# Patient Record
Sex: FEMALE | Race: WHITE | HISPANIC OR LATINO
[De-identification: names, ages, dates, MRNs, and addresses within clinical notes are randomized per-mention and may not be internally consistent; named-entity substitution may affect disease eponyms.]

---

## 2021-07-08 PROBLEM — Z00.00 ENCOUNTER FOR PREVENTIVE HEALTH EXAMINATION: Status: ACTIVE | Noted: 2021-07-08

## 2021-07-09 ENCOUNTER — APPOINTMENT (OUTPATIENT)
Dept: PEDIATRIC ORTHOPEDIC SURGERY | Facility: CLINIC | Age: 46
End: 2021-07-09
Payer: COMMERCIAL

## 2021-07-09 PROCEDURE — 99072 ADDL SUPL MATRL&STAF TM PHE: CPT

## 2021-07-09 PROCEDURE — 99203 OFFICE O/P NEW LOW 30 MIN: CPT

## 2021-07-09 PROCEDURE — 72110 X-RAY EXAM L-2 SPINE 4/>VWS: CPT

## 2021-07-09 RX ORDER — MELOXICAM 15 MG/1
15 TABLET ORAL
Qty: 30 | Refills: 0 | Status: ACTIVE | COMMUNITY
Start: 2021-07-09 | End: 1900-01-01

## 2021-07-09 RX ORDER — ATORVASTATIN CALCIUM 10 MG/1
10 TABLET, FILM COATED ORAL
Refills: 0 | Status: ACTIVE | COMMUNITY

## 2021-07-09 NOTE — ASSESSMENT
[FreeTextEntry1] : Impression: Status post posterior spinal fusion with right lumbar radiculitis.\par \par This patient will be treated with a course of formal physical therapy and Mobic.  We have also discussed the merits of weight reduction.  She will return in 1 month for further evaluation.

## 2021-07-09 NOTE — PHYSICAL EXAM
[de-identified] : Exam today reveals she is overweight.  She has a straight spine level pelvis no leg length discrepancy and a normal gait without limp.  She does have reasonable motion to the lumbar spine in all planes though it is with discomfort at the limits.  Spasm and tenderness is noted more so on the right side though no trigger points present.  Both lower extremities are symmetric in appearance without atrophy and move well at all levels.  Straight leg raising is uncomfortable on the right though no obvious tension signs present she is neurologically intact.\par \par X-rays of the lumbar spine performed today 4 views including AP standing lateral and flexion/extension laterals performed today reveal she is status post a posterior spinal fusion with hardware at L5-S1.  Mild to moderate spondylosis and spur formation and facet arthritis noted.

## 2021-08-10 ENCOUNTER — APPOINTMENT (OUTPATIENT)
Dept: PEDIATRIC ORTHOPEDIC SURGERY | Facility: CLINIC | Age: 46
End: 2021-08-10

## 2021-08-10 RX ORDER — MELOXICAM 15 MG/1
15 TABLET ORAL
Qty: 30 | Refills: 0 | Status: ACTIVE | COMMUNITY
Start: 2021-08-10 | End: 1900-01-01

## 2021-09-02 ENCOUNTER — APPOINTMENT (OUTPATIENT)
Dept: PEDIATRIC ORTHOPEDIC SURGERY | Facility: CLINIC | Age: 46
End: 2021-09-02
Payer: COMMERCIAL

## 2021-09-02 VITALS — BODY MASS INDEX: 32.02 KG/M2 | HEIGHT: 67 IN | WEIGHT: 204 LBS

## 2021-09-02 DIAGNOSIS — M54.16 RADICULOPATHY, LUMBAR REGION: ICD-10-CM

## 2021-09-02 DIAGNOSIS — M67.431 GANGLION, RIGHT WRIST: ICD-10-CM

## 2021-09-02 DIAGNOSIS — M77.11 LATERAL EPICONDYLITIS, RIGHT ELBOW: ICD-10-CM

## 2021-09-02 PROCEDURE — 99072 ADDL SUPL MATRL&STAF TM PHE: CPT

## 2021-09-02 PROCEDURE — 99213 OFFICE O/P EST LOW 20 MIN: CPT

## 2021-09-02 NOTE — PHYSICAL EXAM
[de-identified] : Her exam today reveals she has normal stance and gait much better motion to the lumbar spine is noted with much less spasm and tenderness present no trigger points.  She has negative straight leg raising no tension signs and she remains neurologically intact with regards to both lower extremities.  With regards to the right upper extremity she maintains good motion to the shoulder elbow forearm and wrist at all levels.  She has mild discomfort over the lateral epicondyle of the elbow with no instability on stress of the collateral ligaments.  The wrist reveals a small ganglion cyst along the dorsal aspect of the carpus well-circumscribed with minimal tenderness to palpation.  Motion of the wrist again is good there is no instability on stress and her  strength is good.

## 2021-09-02 NOTE — HISTORY OF PRESENT ILLNESS
[de-identified] : This 45-year-old returns she is improving with regards to her back pain with ongoing physical therapy and Mobic.  She has no significant radiation to either lower extremity at this time only mild back discomfort.  She has no problems of a mass along the dorsal aspect of the wrist which she just noted over the past few weeks with no obvious trauma precipitating event.  She also states she has had tenderness and pain about the lateral aspect of her elbow.  No numbness or paresthesias.  She does have good functional use of the extremity at this time

## 2021-09-02 NOTE — ASSESSMENT
[FreeTextEntry1] : Impression: Resolving lumbar radiculitis.  Ganglion cyst right wrist.  Lateral epicondylitis right elbow.\par \par She will continue with Mobic along with physical therapy for the back.  She has been made aware as to the nature of the cyst involving the wrist along with options for treatment to include aspiration and surgical excision should she have significant pain and functional disability over the long-term.  She will return in 1 month for reevaluation

## 2023-10-18 ENCOUNTER — APPOINTMENT (OUTPATIENT)
Dept: PEDIATRIC ORTHOPEDIC SURGERY | Facility: CLINIC | Age: 48
End: 2023-10-18
Payer: COMMERCIAL

## 2023-10-18 VITALS — SYSTOLIC BLOOD PRESSURE: 124 MMHG | DIASTOLIC BLOOD PRESSURE: 78 MMHG

## 2023-10-18 VITALS — WEIGHT: 204 LBS | HEIGHT: 67 IN | BODY MASS INDEX: 32.02 KG/M2 | TEMPERATURE: 97.5 F

## 2023-10-18 PROCEDURE — 72070 X-RAY EXAM THORAC SPINE 2VWS: CPT

## 2023-10-18 PROCEDURE — 99213 OFFICE O/P EST LOW 20 MIN: CPT

## 2023-10-18 RX ORDER — DICLOFENAC SODIUM 75 MG/1
75 TABLET, DELAYED RELEASE ORAL TWICE DAILY
Qty: 60 | Refills: 1 | Status: ACTIVE | COMMUNITY
Start: 2023-10-18 | End: 1900-01-01

## 2023-10-18 RX ORDER — TIZANIDINE 4 MG/1
4 TABLET ORAL 3 TIMES DAILY
Qty: 30 | Refills: 1 | Status: ACTIVE | COMMUNITY
Start: 2023-10-18 | End: 1900-01-01

## 2023-10-18 NOTE — HISTORY OF PRESENT ILLNESS
Action 10/18/23 MV 5.53pm   Action Taken Imaging request faxed to N    NOTE: Pt's chart is marked to merge with [id number] . [facility] records are in the other chart.     Action 11/10/23 MV 10.40am   Action Taken Images resolved in PACS       RECORDS RECEIVED FROM: external   REASON FOR VISIT: MS   Date of Appt: 11/15/23   NOTES (FOR ALL VISITS) STATUS DETAILS   OFFICE NOTE from referring provider Care Everywhere Dr Kevin Jaffe @ [facility]:  10/12/23   OFFICE NOTE from other specialist Care Everywhere Dr Russell Mead @ [facility]:  12/12/22 12/22/21   DISCHARGE REPORT from the ER Care Everywhere [facility]:  12/18/22   MEDICATION LIST Care Everywhere    IMAGING  (FOR ALL VISITS)     MRI (HEAD, NECK, SPINE) PACS [facility]:  MRI Brain 12/30/21  MRI Cervical Spine 12/30/21         [de-identified] : 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  This patient is status post posterior spinal fusion performed by Dr. Bell and has been doing reasonably well however she over the past couple of months has had back pain radiating into the sole of the right foot.  Mild numbness and tingling.  No motor weakness bladder or bowel dysfunction.  She has continued to function.  She did because of exacerbation of pain and may present to [facility] where she was placed on steroids and this caused her sugars to bump.  Her internist told her she had "prediabetes".

## 2023-12-08 PROBLEM — Z00.00 ENCOUNTER FOR PREVENTIVE HEALTH EXAMINATION: Status: ACTIVE | Noted: 2023-12-08

## 2023-12-15 ENCOUNTER — APPOINTMENT (OUTPATIENT)
Dept: PEDIATRIC ORTHOPEDIC SURGERY | Facility: CLINIC | Age: 48
End: 2023-12-15
Payer: COMMERCIAL

## 2023-12-15 ENCOUNTER — APPOINTMENT (OUTPATIENT)
Dept: PEDIATRIC ORTHOPEDIC SURGERY | Facility: CLINIC | Age: 48
End: 2023-12-15

## 2023-12-15 VITALS
TEMPERATURE: 97 F | WEIGHT: 204 LBS | BODY MASS INDEX: 32.02 KG/M2 | HEIGHT: 67 IN | SYSTOLIC BLOOD PRESSURE: 107 MMHG | DIASTOLIC BLOOD PRESSURE: 74 MMHG

## 2023-12-15 DIAGNOSIS — M23.8X1 OTHER INTERNAL DERANGEMENTS OF RIGHT KNEE: ICD-10-CM

## 2023-12-15 DIAGNOSIS — S33.5XXA SPRAIN OF LIGAMENTS OF LUMBAR SPINE, INITIAL ENCOUNTER: ICD-10-CM

## 2023-12-15 PROCEDURE — 72100 X-RAY EXAM L-S SPINE 2/3 VWS: CPT

## 2023-12-15 PROCEDURE — 99213 OFFICE O/P EST LOW 20 MIN: CPT

## 2023-12-15 PROCEDURE — 73562 X-RAY EXAM OF KNEE 3: CPT | Mod: RT

## 2023-12-15 RX ORDER — TIZANIDINE 2 MG/1
2 TABLET ORAL
Qty: 40 | Refills: 1 | Status: ACTIVE | COMMUNITY
Start: 2023-12-15 | End: 1900-01-01

## 2023-12-15 RX ORDER — DICLOFENAC SODIUM 75 MG/1
75 TABLET, DELAYED RELEASE ORAL TWICE DAILY
Qty: 60 | Refills: 1 | Status: ACTIVE | COMMUNITY
Start: 2023-12-15 | End: 1900-01-01

## 2023-12-15 NOTE — HISTORY OF PRESENT ILLNESS
[de-identified] : This 48-year-old comes in today for evaluation of the low back and right knee.  November 29 she fell hard onto her back and injured her knee as well.  She noted pain and swelling and bruising along the anterior aspect of the knee as well as severe back pain more so right-sided.  She has not had any numbness or paresthesias.  Prior to this she had done well in physical therapy for her thoracic back pain.  It is to be noted she is status post posterior spinal fusion of the lumbar spine in the past.

## 2023-12-15 NOTE — PHYSICAL EXAM
[de-identified] : Exam today reveals she has an antalgic gait on the right side restricted motion to the lumbar spine in flexion and rotation tilt to the right positive tenderness more so on the right side of the lumbar segment where there is a trigger point on the right side in the lumbar musculature.  He does have tenderness along the posterior pelvic wall on the right side.  Straight leg raising is uncomfortable on the right negative she is neurologically intact.  With regards to the right knee she has a mild to moderate effusion with good motion no instability on stress she has discomfort along the anterior aspect of the knee with pain on patellofemoral grind negative has tenderness in the lateral arm and generalized as well.  Popliteal fossa calf neuro vas exam are negative.  X-rays ordered and taken today of the lumbar spine and 3 views of the right knee with Terrazas standing reveal minimal degenerative joint disease no loose body/lesion.  The lumbar spine she is status post posterior spinal fusion with fixation at L4-5 with disc space narrowing lumbar spine facet arthritis spondylosis.  No evidence of fracture

## 2023-12-15 NOTE — ASSESSMENT
[FreeTextEntry1] : Impression: Sprain lumbar spine with muscle pain, derangement right knee.  This patient will be treated with tizanidine and diclofenac with GI questions.  Physical therapy has been ordered as well.  She will return if she is not progressing.

## 2024-02-08 ENCOUNTER — APPOINTMENT (OUTPATIENT)
Dept: PEDIATRIC ORTHOPEDIC SURGERY | Facility: CLINIC | Age: 49
End: 2024-02-08
Payer: COMMERCIAL

## 2024-02-08 VITALS
TEMPERATURE: 96.3 F | HEIGHT: 67 IN | BODY MASS INDEX: 31.71 KG/M2 | WEIGHT: 202 LBS | DIASTOLIC BLOOD PRESSURE: 79 MMHG | SYSTOLIC BLOOD PRESSURE: 123 MMHG

## 2024-02-08 DIAGNOSIS — M79.18 MYALGIA, OTHER SITE: ICD-10-CM

## 2024-02-08 DIAGNOSIS — M54.14 RADICULOPATHY, THORACIC REGION: ICD-10-CM

## 2024-02-08 PROCEDURE — 20552 NJX 1/MLT TRIGGER POINT 1/2: CPT

## 2024-02-08 PROCEDURE — 99213 OFFICE O/P EST LOW 20 MIN: CPT | Mod: 25

## 2024-02-08 RX ORDER — SULINDAC 200 MG/1
200 TABLET ORAL TWICE DAILY
Qty: 60 | Refills: 1 | Status: ACTIVE | COMMUNITY
Start: 2024-02-08 | End: 1900-01-01

## 2024-02-08 NOTE — ASSESSMENT
[FreeTextEntry1] : Impression: Thoracic radiculitis myofascial back pain.  I have injected the trigger point in the thoracic intrascapular region uneventfully she will discontinue the meloxicam I have placed her on clinical.  Physical therapy has been ordered as well.  Return here in

## 2024-02-08 NOTE — PHYSICAL EXAM
[de-identified] : On exam she has a normal stance and gait she has reasonable motion to the cervical spine mild spasm and tenderness on the right side of the subaxial region extending down into the trapezius as well as the thoracic interscapular region on the right side where there is a trigger point present.  Mild discomfort in the upper lumbar segment as well she has reasonable motion to the lumbar spine she remains neurologically intact.  The shoulder has supple and full motion without points of tenderness or instability on stress

## 2024-02-08 NOTE — HISTORY OF PRESENT ILLNESS
[de-identified] : This patient returns she is has ongoing pain in the right shoulder girdle mostly posteriorly in the right side of the interscapular region also is complaining of neck pain as previous.  No numbness or paresthesias she has no difficulty with placement of the extremities in space.

## 2025-06-27 ENCOUNTER — NON-APPOINTMENT (OUTPATIENT)
Age: 50
End: 2025-06-27

## 2025-06-27 ENCOUNTER — APPOINTMENT (OUTPATIENT)
Dept: PEDIATRIC ORTHOPEDIC SURGERY | Facility: CLINIC | Age: 50
End: 2025-06-27
Payer: COMMERCIAL

## 2025-06-27 VITALS — HEIGHT: 67 IN | WEIGHT: 200 LBS | BODY MASS INDEX: 31.39 KG/M2 | TEMPERATURE: 96.5 F

## 2025-06-27 PROCEDURE — 99213 OFFICE O/P EST LOW 20 MIN: CPT

## 2025-06-27 PROCEDURE — 72040 X-RAY EXAM NECK SPINE 2-3 VW: CPT

## 2025-06-27 RX ORDER — TIZANIDINE 2 MG/1
2 TABLET ORAL
Qty: 40 | Refills: 1 | Status: ACTIVE | COMMUNITY
Start: 2025-06-27 | End: 1900-01-01

## 2025-06-27 RX ORDER — SULINDAC 200 MG/1
200 TABLET ORAL TWICE DAILY
Qty: 60 | Refills: 1 | Status: ACTIVE | COMMUNITY
Start: 2025-06-27 | End: 1900-01-01